# Patient Record
Sex: MALE | Race: WHITE
[De-identification: names, ages, dates, MRNs, and addresses within clinical notes are randomized per-mention and may not be internally consistent; named-entity substitution may affect disease eponyms.]

---

## 2017-09-17 ENCOUNTER — HOSPITAL ENCOUNTER (EMERGENCY)
Dept: HOSPITAL 10 - FTE | Age: 57
Discharge: HOME | End: 2017-09-17
Payer: COMMERCIAL

## 2017-09-17 VITALS
WEIGHT: 145.31 LBS | BODY MASS INDEX: 25.75 KG/M2 | HEIGHT: 63 IN | HEIGHT: 63 IN | WEIGHT: 145.31 LBS | BODY MASS INDEX: 25.75 KG/M2

## 2017-09-17 DIAGNOSIS — R11.0: ICD-10-CM

## 2017-09-17 DIAGNOSIS — R21: ICD-10-CM

## 2017-09-17 DIAGNOSIS — R10.13: Primary | ICD-10-CM

## 2017-09-17 LAB
ALBUMIN SERPL-MCNC: 4.1 G/DL (ref 3.3–4.9)
ALBUMIN/GLOB SERPL: 1.36 {RATIO}
ALP SERPL-CCNC: 68 IU/L (ref 42–121)
ALT SERPL-CCNC: 50 IU/L (ref 13–69)
ANION GAP SERPL CALC-SCNC: 13 MMOL/L (ref 8–16)
AST SERPL-CCNC: 31 IU/L (ref 15–46)
BASOPHILS # BLD AUTO: 0 10^3/UL (ref 0–0.1)
BASOPHILS NFR BLD: 0.2 % (ref 0–2)
BILIRUB DIRECT SERPL-MCNC: 0 MG/DL (ref 0–0.2)
BILIRUB SERPL-MCNC: 0.5 MG/DL (ref 0.2–1.3)
BUN SERPL-MCNC: 17 MG/DL (ref 7–20)
CALCIUM SERPL-MCNC: 9.2 MG/DL (ref 8.4–10.2)
CHLORIDE SERPL-SCNC: 106 MMOL/L (ref 97–110)
CO2 SERPL-SCNC: 27 MMOL/L (ref 21–31)
CREAT SERPL-MCNC: 0.88 MG/DL (ref 0.61–1.24)
EOSINOPHIL # BLD: 0.1 10^3/UL (ref 0–0.5)
EOSINOPHIL NFR BLD: 1.3 % (ref 0–7)
ERYTHROCYTE [DISTWIDTH] IN BLOOD BY AUTOMATED COUNT: 13.2 % (ref 11.5–14.5)
GLOBULIN SER-MCNC: 3 G/DL (ref 1.3–3.2)
GLUCOSE SERPL-MCNC: 112 MG/DL (ref 70–220)
HCT VFR BLD CALC: 45.5 % (ref 42–52)
HGB BLD-MCNC: 14.9 G/DL (ref 14–18)
LYMPHOCYTES # BLD AUTO: 1.1 10^3/UL (ref 0.8–2.9)
LYMPHOCYTES NFR BLD AUTO: 22.9 % (ref 15–51)
MCH RBC QN AUTO: 29.3 PG (ref 29–33)
MCHC RBC AUTO-ENTMCNC: 32.7 G/DL (ref 32–37)
MCV RBC AUTO: 89.4 FL (ref 82–101)
MONOCYTES # BLD: 0.6 10^3/UL (ref 0.3–0.9)
MONOCYTES NFR BLD: 12.5 % (ref 0–11)
NEUTROPHILS # BLD: 2.9 10^3/UL (ref 1.6–7.5)
NEUTROPHILS NFR BLD AUTO: 62.9 % (ref 39–77)
NRBC # BLD MANUAL: 0 10^3/UL (ref 0–0)
NRBC BLD AUTO-RTO: 0 /100WBC (ref 0–0)
PLATELET # BLD: 223 10^3/UL (ref 140–415)
PMV BLD AUTO: 9.8 FL (ref 7.4–10.4)
POTASSIUM SERPL-SCNC: 4 MMOL/L (ref 3.5–5.1)
PROT SERPL-MCNC: 7.1 G/DL (ref 6.1–8.1)
RBC # BLD AUTO: 5.09 10^6/UL (ref 4.7–6.1)
SODIUM SERPL-SCNC: 142 MMOL/L (ref 135–144)
WBC # BLD AUTO: 4.6 10^3/UL (ref 4.8–10.8)

## 2017-09-17 PROCEDURE — 83690 ASSAY OF LIPASE: CPT

## 2017-09-17 PROCEDURE — 99284 EMERGENCY DEPT VISIT MOD MDM: CPT

## 2017-09-17 PROCEDURE — 85025 COMPLETE CBC W/AUTO DIFF WBC: CPT

## 2017-09-17 PROCEDURE — 80053 COMPREHEN METABOLIC PANEL: CPT

## 2017-09-17 PROCEDURE — 87400 INFLUENZA A/B EACH AG IA: CPT

## 2017-09-17 PROCEDURE — 81003 URINALYSIS AUTO W/O SCOPE: CPT

## 2017-09-17 NOTE — ERD
ER Documentation


Chief Complaint


Date/Time


DATE: 17 


TIME: 08:30


Chief Complaint


hax3 days w/nausea, upper body rash x1 day





HPI


There is a 57-year-old male who presents the emergency department today 

complaining of headache, Pain in his eyes, nausea, abdominal pain for the past 

3 days and a rash that started on his upper body and arms yesterday.  Denies 

any blurred vision or vision changes.  States he has had history of migraines 

in the past but has not had one for 2 years.  Denies any sick contacts. Denies 

any cough. States he has tried Tylenol and NyQuil.





ROS


All systems reviewed and are negative except as per history of present illness.





Medications


Home Meds


Active Scripts


Diphenhydramine Hcl* (Benadryl*) 25 Mg Cap, 25 MG PO Q6, #30 CAP


   Prov:LUCIA RIVAS PA-C         17


Naproxen* (Naprosyn*) 500 Mg Tablet, 500 MG PO BID Y for PAIN AND/OR 

INFLAMMATION, #30 TAB


   Prov:LUCIA RIVAS PA-C         17


Hydrocodone/Acetaminophen (Norco 5-325 Tablet) 1 Each Tablet, 1 TAB PO Q6H Y 

for PAIN, #10 TAB


   Prov:LUCIA RIVAS PA-C         17


Ondansetron Hcl* (Zofran*) 4 Mg Tablet, 4 MG PO Q6H for NAUSEA AND/OR VOMITING, 

#30 TAB


   Prov:LUCIA RIVAS PA-C         17


Ondansetron Hcl* (Zofran* ODT) 4 mg -ODT Tab.disper, 4 MG PO Q8 Y for NAUSEA AND

/OR VOMITING, #30 TAB


   Prov:ODELL CUEVAS NP         16


Acetaminophen* (Tylophen*) 500 Mg Capsule, 1 CAP PO Q6H Y for PAIN AND OR 

ELEVATED TEMP, #20 CAP


   Prov:ODELL CUEVAS NP         16


Dicyclomine Hcl* (Bentyl*) 10 Mg Capsule, 10 MG PO QID for abdominal cramping, #

30 CAP


   Prov:ODELL CUEVAS NP         16


Reported Medications


[none] Unknown Strength  No Conflict Check


   16





Allergies


Allergies:  


Coded Allergies:  


     No Known Drug Allergies (Verified  Allergy, Unknown, 16)





PMhx/Soc


History of Surgery:  No


Anesthesia Reaction:  No


Hx Neurological Disorder:  No


Hx Respiratory Disorders:  No


Hx Psychiatric Problems:  No


Hx Miscellaneous Medical Probl:  No


Hx Alcohol Use:  No


Hx Substance Use:  No


Hx Tobacco Use:  No


Smoking Status:  Never smoker





Physical Exam


Vitals





Vital Signs








  Date Time  Temp Pulse Resp B/P Pulse Ox O2 Delivery O2 Flow Rate FiO2


 


17 07:53 98.6 86 20 119/75 98   








Physical Exam


Const:    NAD


Head:   Atraumatic 


Eyes:    Bilateral conjunctival erythema.  No purulent drainage.  PERRLA.  EOM 

intact.


ENT:    Ears TMs normal.  Nose no drainage.  Throat no erythema no exudate


Neck:               Full range of motion..~ No meningismus.


Resp:    Clear to auscultation bilaterally


Cardio:    Regular rate and rhythm, no murmurs


Abd:    Soft, Epigastric tenderness, non distended. Normal bowel sounds No 

right upper quadrant pain.  No tenderness at McBurney's


Skin:    Diffuse macular rash over bilateral arms chest and back


Back:    No midline or flank tenderness


Ext:    No cyanosis, or edema


Neur:    Awake and alert.  Cranial nerves II through XII intact.  No gait 

ataxia.


Psych:    Normal Mood and Affect


Result Diagram:  


17 0820                                                                   

             17 0820





Results 24 hrs





 Laboratory Tests








Test


  17


08:20 17


08:26


 


White Blood Count 4.610^3/ul  


 


Red Blood Count 5.0910^6/ul  


 


Hemoglobin 14.9g/dl  


 


Hematocrit 45.5%  


 


Mean Corpuscular Volume 89.4fl  


 


Mean Corpuscular Hemoglobin 29.3pg  


 


Mean Corpuscular Hemoglobin


Concent 32.7g/dl 


  


 


 


Red Cell Distribution Width 13.2%  


 


Platelet Count 04222^3/UL  


 


Mean Platelet Volume 9.8fl  


 


Neutrophils % 62.9%  


 


Lymphocytes % 22.9%  


 


Monocytes % 12.5%  


 


Eosinophils % 1.3%  


 


Basophils % 0.2%  


 


Nucleated Red Blood Cells % 0.0/100WBC  


 


Neutrophils # 2.910^3/ul  


 


Lymphocytes # 1.110^3/ul  


 


Monocytes # 0.610^3/ul  


 


Eosinophils # 0.110^3/ul  


 


Basophils # 0.010^3/ul  


 


Nucleated Red Blood Cells # 0.010^3/ul  


 


Sodium Level 142mmol/L  


 


Potassium Level 4.0mmol/L  


 


Chloride Level 106mmol/L  


 


Carbon Dioxide Level 27mmol/L  


 


Anion Gap 13  


 


Blood Urea Nitrogen 17mg/dl  


 


Creatinine 0.88mg/dl  


 


Glucose Level 112mg/dl  


 


Calcium Level 9.2mg/dl  


 


Total Bilirubin 0.5mg/dl  


 


Direct Bilirubin 0.00mg/dl  


 


Indirect Bilirubin 0.5mg/dl  


 


Aspartate Amino Transf


(AST/SGOT) 31IU/L 


  


 


 


Alanine Aminotransferase


(ALT/SGPT) 50IU/L 


  


 


 


Alkaline Phosphatase 68IU/L  


 


Total Protein 7.1g/dl  


 


Albumin 4.1g/dl  


 


Globulin 3.00g/dl  


 


Albumin/Globulin Ratio 1.36  


 


Lipase 93U/L  


 


Bedside Urine pH (LAB)  6.0 


 


Bedside Urine Protein (LAB)  Negative 


 


Bedside Urine Glucose (UA)  Negative 


 


Bedside Urine Ketones (LAB)  Negative 


 


Bedside Urine Blood  Trace-lysed 


 


Bedside Urine Nitrite (LAB)  Negative 


 


Bedside Urine Leukocyte


Esterase (L 


  Negative 


 








 Current Medications








 Medications


  (Trade)  Dose


 Ordered  Sig/Jefry


 Route


 PRN Reason  Start Time


 Stop Time Status Last Admin


Dose Admin


 


 Acetaminophen/


 Hydrocodone Bitart


  (Norco (5/325))  1 tab  ONCE  ONCE


 PO


   17 08:30


 17 08:31 DC 17 08:14


 


 


 Ondansetron HCl


  (Zofran Odt)  4 mg  ONCE  STAT


 ODT


   17 08:03


 17 08:05 DC 17 08:14


 





RUN DATE: 17      [facility]                

  PAGE 1   


RUN TIME: [id number] [place] [address]


                         Chet Tsai M.D. Medical Director 


                                  SHALINI#: [id number]  


                           Ph: [phone]  Fax: [phone]














--------------------------------------------------------------------------------

------------





Name:  ELLEN HONG         Age/Sex: 57/M      Attend Dr: HANNAH AVILA MD  


Acct:  [id number]  MR# : [id number]  : 1960    Location:  [facility]       

             


Admit: 17


--------------------------------------------------------------------------------

------------








Specimen: [id number]    Status: Complete    Padma:     Rcvd: 


                                Source:    MINDA       Sp Descrip:        

   


--------------------------------------------------------------------------------

------------





  Procedure                         Result                                     

           


--------------------------------------------------------------------------------

------------





                                                                               

            


                                    *** Microbiology ***                       

             


                                                                               

            


  INFLUENZA A & B BY EIA  Final  


        INFLU A&B BY EIA            INFLUENZA A NEGATIVE (Ref Range Neg)


                                    INFLUENZA B NEGATIVE (Ref Range Neg)





--------------------------------------------------------------------------------

------------
















































































 

................................................................................

............




 Flags: Critical Hi = *H      Critical Lo = *L      Microbiology Abnormal = *


        Abnormal Hi =  H      Abnormal Lo =  L      Blood Bank Abnormal   = *


 Susceptability Flags:   S = Sensitive     R = Resistant     I = Intermediate





                                   ** END OF REPORT **     





Procedures/MDM


There is a 57-year-old male who presents emergency department today with 

multiple complaints.  Given patient's abdominal pain and nausea I did obtain 

laboratory workup





Laboratory workup shows he has no elevated white blood cell count.  He is not 

anemic.  Platelets are within normal limits. Lipase is within normal limits


UA Is negative for infection.


influenza A and B is negative





Patient had no right upper quadrant no right lower quadrant pain and I did not 

feel he required further imaging.  I have low suspicion for acute surgical 

abdomen.Low suspicion for acute appendicitis, acute cholecystitis.


Patient also presented with a left-sided headache for the past couple of days.  

He is afebrile and otherwise well-appearing.  He has no focal neurologic 

deficits and no gait ataxia.  I do not feel he requires a head CT scan at this 

time.  Low suspicion for acute hemorrhage, mass, abscess, meningitis.





Patient multiple complaints at this time appear to be viral in nature 

especially given his rash.  Low suspicion for SJS, meningitis, TEN and, 

cellulitis, deep space infection





Patient was given Norco, Zofran here in the emergency department The patient 

reported feeling significantly better.  Patient will be given a prescription 

for short course of Norco, Naprosyn, Zofran and Benadryl.





At this time the patient is stable for discharge and outpatient management. 

Patient should follow up with their PCP in the next 1-2 days.  They may return 

to the emergency department sooner for any persistent or worsening of symptoms.

  Patient understood and agreed with the plan.





Departure


Diagnosis:  


 Primary Impression:  


 Multiple complaints


Condition:  LUCIA Anderson PA-C Sep 17, 2017 08:34

## 2019-08-29 ENCOUNTER — HOSPITAL ENCOUNTER (EMERGENCY)
Dept: HOSPITAL 91 - FTE | Age: 59
LOS: 1 days | Discharge: HOME | End: 2019-08-30
Payer: SELF-PAY

## 2019-08-29 ENCOUNTER — HOSPITAL ENCOUNTER (EMERGENCY)
Dept: HOSPITAL 10 - FTE | Age: 59
LOS: 1 days | Discharge: HOME | End: 2019-08-30
Payer: COMMERCIAL

## 2019-08-29 VITALS
WEIGHT: 151.02 LBS | BODY MASS INDEX: 25.16 KG/M2 | BODY MASS INDEX: 25.16 KG/M2 | HEIGHT: 65 IN | WEIGHT: 151.02 LBS | HEIGHT: 65 IN

## 2019-08-29 DIAGNOSIS — R51: Primary | ICD-10-CM

## 2019-08-29 DIAGNOSIS — R11.2: ICD-10-CM

## 2019-08-29 PROCEDURE — 70450 CT HEAD/BRAIN W/O DYE: CPT

## 2019-08-29 PROCEDURE — 96372 THER/PROPH/DIAG INJ SC/IM: CPT

## 2019-08-29 PROCEDURE — 99285 EMERGENCY DEPT VISIT HI MDM: CPT

## 2019-08-29 PROCEDURE — 82962 GLUCOSE BLOOD TEST: CPT

## 2019-08-29 RX ADMIN — ONDANSETRON 1 MG: 4 TABLET, ORALLY DISINTEGRATING ORAL at 23:16

## 2019-08-29 RX ADMIN — HYDROCODONE BITARTRATE AND ACETAMINOPHEN 1 TAB: 5; 325 TABLET ORAL at 23:16

## 2019-08-30 VITALS — DIASTOLIC BLOOD PRESSURE: 57 MMHG | SYSTOLIC BLOOD PRESSURE: 109 MMHG | HEART RATE: 66 BPM | RESPIRATION RATE: 18 BRPM

## 2019-08-30 RX ADMIN — KETOROLAC TROMETHAMINE 1 MG: 30 INJECTION, SOLUTION INTRAMUSCULAR at 00:15
